# Patient Record
Sex: MALE | Race: WHITE
[De-identification: names, ages, dates, MRNs, and addresses within clinical notes are randomized per-mention and may not be internally consistent; named-entity substitution may affect disease eponyms.]

---

## 2019-02-18 LAB
BUN BLD-MCNC: 16 MG/DL (ref 7–18)
GLUCOSE SERPLBLD-MCNC: 162 MG/DL (ref 74–106)
HCT VFR BLD CALC: 42.6 % (ref 39.6–49)
INR BLD: 1.17
LYMPHOCYTES # SPEC AUTO: 1.7 K/UL (ref 0.7–4.9)
PMV BLD: 8 FL (ref 7.6–11.3)
POTASSIUM SERPL-SCNC: 4.2 MMOL/L (ref 3.5–5.1)
RBC # BLD: 4.49 M/UL (ref 4.33–5.43)

## 2019-02-18 NOTE — EKG
Test Date:    2019-02-18               Test Time:    14:13:37

Technician:   NILO                                    

                                                     

MEASUREMENT RESULTS:                                       

Intervals:                                           

Rate:         62                                     

NY:                                                  

QRSD:         104                                    

QT:           406                                    

QTc:          412                                    

Axis:                                                

P:                                                   

NY:                                                  

QRS:          -19                                    

T:            91                                     

                                                     

INTERPRETIVE STATEMENTS:                                       

                                                     

Atrial fibrillation with VVI pacing

Nonspecific ST and T wave abnormality

Abnormal ECG

Compared to ECG 09/19/2013 07:43:26

ventricular pacing is now evident

Electronically Signed On 02-18-19 17:22:17 CST by Edy Graves

## 2019-02-18 NOTE — RAD REPORT
EXAM DESCRIPTION:  Ho Marroquin And Sirena (2 Views)2/18/2019 2:41 pm

 

CLINICAL HISTORY:  Preop for carotid surgery

 

COMPARISON:  2013

 

FINDINGS:   The lungs appear clear of acute infiltrate. The heart is mildly enlarged. Pacemaker leads
 are in place.

 

IMPRESSION:   No acute abnormalities displayed

## 2019-02-19 ENCOUNTER — HOSPITAL ENCOUNTER (OUTPATIENT)
Dept: HOSPITAL 97 - CCL | Age: 79
Discharge: HOME | End: 2019-02-19
Attending: INTERNAL MEDICINE
Payer: COMMERCIAL

## 2019-02-19 VITALS — SYSTOLIC BLOOD PRESSURE: 143 MMHG | OXYGEN SATURATION: 98 % | DIASTOLIC BLOOD PRESSURE: 60 MMHG

## 2019-02-19 VITALS — TEMPERATURE: 97.1 F

## 2019-02-19 DIAGNOSIS — I65.23: Primary | ICD-10-CM

## 2019-02-19 DIAGNOSIS — I67.9: ICD-10-CM

## 2019-02-19 DIAGNOSIS — I25.10: ICD-10-CM

## 2019-02-19 DIAGNOSIS — Z95.2: ICD-10-CM

## 2019-02-19 DIAGNOSIS — E11.9: ICD-10-CM

## 2019-02-19 DIAGNOSIS — E78.5: ICD-10-CM

## 2019-02-19 DIAGNOSIS — I10: ICD-10-CM

## 2019-02-19 DIAGNOSIS — Z95.1: ICD-10-CM

## 2019-02-19 DIAGNOSIS — I35.0: ICD-10-CM

## 2019-02-19 PROCEDURE — 36415 COLL VENOUS BLD VENIPUNCTURE: CPT

## 2019-02-19 PROCEDURE — 82962 GLUCOSE BLOOD TEST: CPT

## 2019-02-19 PROCEDURE — 36222 PLACE CATH CAROTID/INOM ART: CPT

## 2019-02-19 PROCEDURE — 80048 BASIC METABOLIC PNL TOTAL CA: CPT

## 2019-02-19 PROCEDURE — 93005 ELECTROCARDIOGRAM TRACING: CPT

## 2019-02-19 PROCEDURE — 85730 THROMBOPLASTIN TIME PARTIAL: CPT

## 2019-02-19 PROCEDURE — 85610 PROTHROMBIN TIME: CPT

## 2019-02-19 PROCEDURE — 71046 X-RAY EXAM CHEST 2 VIEWS: CPT

## 2019-02-19 PROCEDURE — 85025 COMPLETE CBC W/AUTO DIFF WBC: CPT

## 2019-02-19 NOTE — OP
Surgeon:  Froylan Urban MD



Assistant:  Radhames Kirkland.



Admitted on 02/19/2019 as an outpatient for a carotid angiogram.



Procedure:  Selective bilateral carotid angiogram.



Indication:  Cerebrovascular disease with positive carotid Doppler. 



Mr. Card is 78, has history of CAD, status post CABG.  Has history of hypertension, dyslipidemia,
 diabetes, positive carotid Doppler on the right.  Has had a history of aortic valve replacement as w
ell.



Description Of Procedure:  Was admitted as an outpatient to the cath lab, prepped and draped in the r
outine sterile fashion.  A JR4 6-Taiwanese was used as a catheter to do the carotid angiography.  He had
 a 6-Taiwanese sheath introduced in the right common femoral artery.  Angio-Seal was used to close the c
ase.  Carotid angiography revealed 75% to 80% right internal carotid artery, and the external carotid
 artery has some moderate plaquing bilaterally.  He has some plaquing in the left common carotid seng
ry.



Total Conscious Sedation:  30 minutes.



Complications:  None.



Blood Loss:  5 cc.



Postoperative Diagnosis:  Cerebrovascular disease.



Plan:  For a CEA on the right side of his carotid.



:  ISABELA Masterson/HOA

DD:  02/19/2019 10:28:00Voice ID:  [id number]

DT:  02/19/2019 22:12:33Report ID:  [id number]

## 2020-02-17 LAB
BUN BLD-MCNC: 18 MG/DL (ref 7–18)
GLUCOSE SERPLBLD-MCNC: 129 MG/DL (ref 74–106)
HCT VFR BLD CALC: 37 % (ref 39.6–49)
INR BLD: 2.69
LYMPHOCYTES # SPEC AUTO: 1.4 K/UL (ref 0.7–4.9)
PMV BLD: 7.7 FL (ref 7.6–11.3)
POTASSIUM SERPL-SCNC: 4.2 MMOL/L (ref 3.5–5.1)
RBC # BLD: 4.01 M/UL (ref 4.33–5.43)

## 2020-02-17 NOTE — RAD REPORT
EXAM DESCRIPTION:  RAD - Chest Pa And Lat (2 Views) - 2/17/2020 11:41 am

 

CLINICAL HISTORY:  pre-op for cardioversion

Chest pain.

 

COMPARISON:  Chest Pa And Lat (2 Views) dated 2/18/2019; CHEST SINGLE VIEW dated 9/18/2013; CHEST PA 
AND LAT 2 VIEW dated 5/4/2011; CHEST PA AND LAT 2 VIEW dated 7/7/2010

 

FINDINGS:  The lungs are clear. The heart is moderately enlarged with a multi lead pacer device. No d
isplaced fractures. Sternotomy wires seen.

## 2020-02-17 NOTE — EKG
Test Date:    2020-02-17               Test Time:    11:39:44

Technician:   MEENAKSHI                                     

                                                     

MEASUREMENT RESULTS:                                       

Intervals:                                           

Rate:         79                                     

RI:                                                  

QRSD:         106                                    

QT:           392                                    

QTc:          449                                    

Axis:                                                

P:                                                   

RI:                                                  

QRS:          18                                     

T:            108                                    

                                                     

INTERPRETIVE STATEMENTS:                                       

                                                     

Demand pacemaker, interpretation is based on intrinsic rhythm

Atrial fibrillation with paced complexes

Abnormal ECG

Compared to ECG 02/18/2019 14:13:37

Ventricular premature complex(es) now present

ST (T wave) deviation no longer present



Electronically Signed On 02-17-20 15:26:26 CST by Edy Graves

## 2020-02-18 ENCOUNTER — HOSPITAL ENCOUNTER (OUTPATIENT)
Dept: HOSPITAL 97 - CCL | Age: 80
Discharge: HOME HEALTH | End: 2020-02-18
Attending: INTERNAL MEDICINE
Payer: COMMERCIAL

## 2020-02-18 VITALS — BODY MASS INDEX: 29.3 KG/M2

## 2020-02-18 VITALS — TEMPERATURE: 97.9 F | DIASTOLIC BLOOD PRESSURE: 56 MMHG | OXYGEN SATURATION: 96 % | SYSTOLIC BLOOD PRESSURE: 125 MMHG

## 2020-02-18 DIAGNOSIS — Z95.2: ICD-10-CM

## 2020-02-18 DIAGNOSIS — K21.9: ICD-10-CM

## 2020-02-18 DIAGNOSIS — Z79.01: ICD-10-CM

## 2020-02-18 DIAGNOSIS — I25.10: ICD-10-CM

## 2020-02-18 DIAGNOSIS — I48.91: Primary | ICD-10-CM

## 2020-02-18 DIAGNOSIS — Z95.1: ICD-10-CM

## 2020-02-18 DIAGNOSIS — I10: ICD-10-CM

## 2020-02-18 DIAGNOSIS — E78.5: ICD-10-CM

## 2020-02-18 DIAGNOSIS — E11.9: ICD-10-CM

## 2020-02-18 PROCEDURE — 85025 COMPLETE CBC W/AUTO DIFF WBC: CPT

## 2020-02-18 PROCEDURE — 82947 ASSAY GLUCOSE BLOOD QUANT: CPT

## 2020-02-18 PROCEDURE — 85730 THROMBOPLASTIN TIME PARTIAL: CPT

## 2020-02-18 PROCEDURE — 71046 X-RAY EXAM CHEST 2 VIEWS: CPT

## 2020-02-18 PROCEDURE — 80048 BASIC METABOLIC PNL TOTAL CA: CPT

## 2020-02-18 PROCEDURE — 92960 CARDIOVERSION ELECTRIC EXT: CPT

## 2020-02-18 PROCEDURE — 85610 PROTHROMBIN TIME: CPT

## 2020-02-18 PROCEDURE — 36415 COLL VENOUS BLD VENIPUNCTURE: CPT

## 2020-02-18 PROCEDURE — 93005 ELECTROCARDIOGRAM TRACING: CPT

## 2020-02-18 NOTE — EKG
Test Date:    2020-02-18               Test Time:    07:57:53

Technician:   MEENAKSHI                                     

                                                     

MEASUREMENT RESULTS:                                       

Intervals:                                           

Rate:         72                                     

NH:           254                                    

QRSD:         110                                    

QT:           404                                    

QTc:          442                                    

Axis:                                                

P:            74                                     

NH:           254                                    

QRS:          27                                     

T:            109                                    

                                                     

INTERPRETIVE STATEMENTS:                                       

                                                     

Sinus rhythm with 1st degree AV block

Low voltage QRS

ST & T wave abnormality, consider lateral ischemia

Abnormal ECG

Compared to ECG 02/18/2020 07:57:26

First degree AV block now present

Low QRS voltage now present

ST (T wave) deviation now present

Possible ischemia now present

Ventricular-paced complex(es) or rhythm no longer present



Electronically Signed On 02-18-20 15:24:48 CST by Froylan Urban

## 2020-02-18 NOTE — OP
Date of Procedure:  02/18/2020



Surgeon:  Froylan Urban MD



Assistant:  Idania Romero.   



The patient will go home when he wakes up and I will see him in the office in the next 2 to 4 weeks.



Admitted to my service as an outpatient on today 02/18/2020.



Reason For Admission:  Direct current cardioversion.



Indication:  Atrial fibrillation.



History Of Present Illness:  Mr. Card is 79-year-old white male, has multiple medical problems in
cluding history of aortic stenosis status post AVR, history of bypass surgery for coronary artery dis
ease, history of carotid surgery secondary to carotid stenosis, diabetes, hypertension, dyslipidemia,
 paroxysmal atrial fibrillation with symptoms of significant dyspnea on exertion, unresponsive to bet
a-blockers and Coumadin.  He was admitted for cardioversion.  He received 8 mg of Versed IV push for 
sedation, received 1 shock of 200 joules and converted to sinus rhythm.  There were no complications 
or blood loss.



Final Diagnosis:  Successful cardioversion of atrial fibrillation to sinus rhythm.  



We will continue Coumadin.  Stop metoprolol.  Start Multaq 400 mg b.i.d.





NB/HOA

DD:  02/18/2020 07:50:11Voice ID:  [id number]

DT:  02/18/2020 18:19:28Report ID:  [id number]

## 2020-02-18 NOTE — EKG
Test Date:    2020-02-18               Test Time:    07:57:26

Technician:   MEENAKSHI                                     

                                                     

MEASUREMENT RESULTS:                                       

Intervals:                                           

Rate:         70                                     

VT:           156                                    

QRSD:         180                                    

QT:           474                                    

QTc:          511                                    

Axis:                                                

P:            73                                     

VT:           156                                    

QRS:          153                                    

T:            83                                     

                                                     

INTERPRETIVE STATEMENTS:                                       

                                                     

Electronic ventricular pacemaker

Compared to ECG 02/17/2020 11:39:44

Atrial fibrillation no longer present



Electronically Signed On 02-18-20 15:24:49 CST by Froylan Urban

## 2020-02-20 ENCOUNTER — HOSPITAL ENCOUNTER (EMERGENCY)
Dept: HOSPITAL 97 - ER | Age: 80
Discharge: HOME | End: 2020-02-20
Payer: COMMERCIAL

## 2020-02-20 VITALS — DIASTOLIC BLOOD PRESSURE: 47 MMHG | TEMPERATURE: 98.2 F | SYSTOLIC BLOOD PRESSURE: 152 MMHG | OXYGEN SATURATION: 95 %

## 2020-02-20 DIAGNOSIS — Z95.4: ICD-10-CM

## 2020-02-20 DIAGNOSIS — M10.9: Primary | ICD-10-CM

## 2020-02-20 DIAGNOSIS — I10: ICD-10-CM

## 2020-02-20 PROCEDURE — 99283 EMERGENCY DEPT VISIT LOW MDM: CPT

## 2020-02-20 NOTE — EDPHYS
Physician Documentation                                                                           

 [facility]                                                                 

Name: Mamadou Card                                                                              

Age: 79 yrs                                                                                       

Sex: Male                                                                                         

: 1940                                                                                   

MRN: [id number]                                                                                   

Arrival Date: 2020                                                                          

Time: 04:04                                                                                       

Account#: [id number]                                                                            

Bed 18                                                                                            

Private MD:                                                                                       

MILAN Physician Akira Jaramillo                                                                     

Historical:                                                                                       

- Allergies:                                                                                      

                                                                                             

05:01 No Known Allergies;                                                                       

- PMHx:                                                                                           

05:01 Bypass; CHF; Diabetes - NIDDM; Hypertension; High Cholesterol;                            

- PSHx:                                                                                           

05:01 Aortic Valve Replacement; Cardioversion;                                                  

                                                                                                  

- Immunization history:: Adult Immunizations up to date.                                          

- Coronavirus screen:: The patient has NOT traveled to [place] in the past 14 days.                 

- Social history:: Smoking status: Patient/guardian denies using.                                 

- Ebola Screening: : Patient negative for fever greater than or equal to 101.5 degrees            

  Fahrenheit, and additional compatible Ebola Virus Disease symptoms Patient denies               

  exposure to infectious person.                                                                  

                                                                                                  

                                                                                                  

Vital Signs:                                                                                      

04:15  / 47; Pulse 68; Resp 18; Temp 98.2; Pulse Ox 95% ; Weight 90.72 kg; Height 5 ft. wh  

      9 in. (175.26 cm); Pain 5/10;                                                               

05:04  / 55; Pulse 70; Resp 18; Pulse Ox 95% ;                                            

04:15 Body Mass Index 29.53 (90.72 kg, 175.26 cm)                                               

                                                                                                  

MDM:                                                                                              

04:07 Patient medically screened.                                                             tw4 

                                                                                                  

Administered Medications:                                                                         

04:47 Drug: Colchicine-Probenecid 1 tabs {Note: 2 tabs of Colchicine 0.6 as per MD              

      instructions .} Route: PO;                                                                  

05:12 Follow up: Response: No adverse reaction                                                  

04:48 Not Given (Physician Discretion): Norco (7.5 mg-325 mg) 1 tabs PO once; RASS on ADMIN:    

      Combtv4, Very Agttd3, Agttd2, Rstlss1, AlertClm0, Drwsy-1, Lt Sdtn-2, Mod Sdtn-3, Dp        

      Sdtn-4, UnArsble-5                                                                          

04:53 Drug: Norco 5 mg-325 mg 1 tabs Route: PO;                                                 

05:12 Follow up: Response: No adverse reaction; Pain is decreased; RASS: Alert and Calm (0)     

                                                                                                  

                                                                                                  

Disposition:                                                                                      

20 04:56 Discharged to Home. Impression: Gout.                                              

- Condition is Stable.                                                                            

- Discharge Instructions: Gout.                                                                   

- Prescriptions for colchicine 0.6 mg Oral tablet - take 1 tablet by ORAL route once              

  daily take one tab 0.6 mg one hour after acute flare then wait 12 hours take 0.6mg              

  for 7 days; 8 tablet. Tylenol- Codeine #3 300-30 mg Oral Tablet - take 2 tablet by              

  ORAL route every 6 hours As needed; 6 tablet.                                                   

- Medication Reconciliation Form, Thank You Letter, Antibiotic Education, Prescription            

  Opioid Use form.                                                                                

- Follow up: Private Physician; When: Upon discharge from the Emergency Department;               

  Reason: Recheck today's complaints, Continuance of care, Re-evaluation by your                  

  physician.                                                                                      

- Problem is new.                                                                                 

- Symptoms have improved.                                                                         

                                                                                                  

                                                                                                  

                                                                                                  

Addendum:                                                                                         

2020                                                                                        

     07:23 Addendum: Pt is a 79 year old patient with a history of gout comes to the ED with       t
w4

           complaint of right knee pain since yesterday. Pt denies fever or chills. Pt denies     

           states that he has some difficulty walking. Pt denies other symptoms at this time.     

           Addendum: ROS: Constitutional; negative for fever chills HEENT: negative for sore      

           throat, dysphagia Resp: negative for SOB, DURON CV: negative for CP, DURON, palpations Ext:

           positive for swelling of the right knee, mild joint effusion, pulses intact.           

     07:41 Addendum: PE: General: well developed well nourished male in NAD HEENT:PERRLA, EOMI     t
w4

           Resp:CTAB no resp distress CV: RRR, S1,S2 no murmurs no gallops Ext: right knee mild   

           joint effusion no erythema, decreased ROM, pulses intact.                              

                                                                                                  

Signatures:                                                                                       

Anabela Chu                                                                                   

Akira Jaramillo MD MD   tw4                                                  

                                                                                                  

Corrections: (The following items were deleted from the chart)                                    

                                                                                             

05:12 04:56 2020 04:56 Discharged to Home. Impression: Gout. Condition is Stable. Forms wh  

      are Medication Reconciliation Form, Thank You Letter, Antibiotic Education,                 

      Prescription Opioid Use. Follow up: Private Physician; When: Upon discharge from the        

      Emergency Department; Reason: Recheck today's complaints, Continuance of care,              

      Re-evaluation by your physician. Problem is new. Symptoms have improved. tw4                

                                                                                                  

**************************************************************************************************

## 2020-02-20 NOTE — ER
Nurse's Notes                                                                                     

 [facility]                                                                 

Name: Mamadou Card                                                                              

Age: 79 yrs                                                                                       

Sex: Male                                                                                         

: 1940                                                                                   

MRN: [id number]                                                                                   

Arrival Date: 2020                                                                          

Time: 04:04                                                                                       

Account#: [id number]                                                                            

Bed 18                                                                                            

Private MD:                                                                                       

Diagnosis: Gout                                                                                   

                                                                                                  

Presentation:                                                                                     

                                                                                             

04:10 Presenting complaint: Patient states: Right knee pain that started yesterday. Pt states wh  

      Hx of gout and that he has the same problem a year ago with the same leg that was           

      drained by Dr Carlos. Transition of care: patient was not received from another          

      setting of care. Onset of symptoms was 2020. Risk Assessment: Do you want      

      to hurt yourself or someone else? Patient reports no desire to harm self or others.         

      Initial Sepsis Screen: Does the patient meet any 2 criteria? No. Patient's initial          

      sepsis screen is negative. Does the patient have a suspected source of infection? No.       

      Patient's initial sepsis screen is negative. Care prior to arrival: None.                   

04:10 Method Of Arrival: Wheelchair                                                             

04:10 Acuity: Unassigned                                                                        

04:10 Acuity: JOSE JUAN 4                                                                             

                                                                                                  

Historical:                                                                                       

- Allergies:                                                                                      

05:01 No Known Allergies;                                                                       

- PMHx:                                                                                           

05:01 Bypass; CHF; Diabetes - NIDDM; Hypertension; High Cholesterol;                            

- PSHx:                                                                                           

05:01 Aortic Valve Replacement; Cardioversion;                                                wh  

                                                                                                  

- Immunization history:: Adult Immunizations up to date.                                          

- Coronavirus screen:: The patient has NOT traveled to [place] in the past 14 days.                 

- Social history:: Smoking status: Patient/guardian denies using.                                 

- Ebola Screening: : Patient negative for fever greater than or equal to 101.5 degrees            

  Fahrenheit, and additional compatible Ebola Virus Disease symptoms Patient denies               

  exposure to infectious person.                                                                  

                                                                                                  

                                                                                                  

Screenin:10 Abuse screen: Denies threats or abuse. Denies injuries from another. Nutritional          

      screening: No deficits noted. Tuberculosis screening: No symptoms or risk factors           

      identified. Fall Risk None identified.                                                      

                                                                                                  

Assessment:                                                                                       

04:15 General: Appears in no apparent distress. Behavior is calm, cooperative, appropriate    wh  

      for age. Pain: Complains of pain in Right Knee Pain does not radiate. Pain currently is     

      5 out of 10 on a pain scale. Quality of pain is described as aching, Pain began 1 day       

      ago. Neuro: Level of Consciousness is awake, alert, obeys commands, Oriented to person,     

      place, time, situation, Appropriate for age. Cardiovascular: Capillary refill < 3           

      seconds. Respiratory: Airway is patent Respiratory effort is even, unlabored,               

      Respiratory pattern is regular, symmetrical. GI: Abdomen is flat, non-distended. : No     

      signs and/or symptoms were reported regarding the genitourinary system. EENT: No signs      

      and/or symptoms were reported regarding the EENT system. Derm: Skin is intact, is           

      healthy with good turgor, Skin is pink, warm \T\ dry. normal. Musculoskeletal:              

      Circulation, motion, and sensation intact.                                                  

                                                                                                  

Vital Signs:                                                                                      

04:15  / 47; Pulse 68; Resp 18; Temp 98.2; Pulse Ox 95% ; Weight 90.72 kg; Height 5 ft. wh  

      9 in. (175.26 cm); Pain 5/10;                                                               

05:04  / 55; Pulse 70; Resp 18; Pulse Ox 95% ;                                          wh  

04:15 Body Mass Index 29.53 (90.72 kg, 175.26 cm)                                               

                                                                                                  

ED Course:                                                                                        

04:04 Patient arrived in ED.                                                                  cl3 

04:07 Akira Jaramillo MD is Attending Physician.                                            tw4 

04:10 Patient has correct armband on for positive identification. Bed in low position. Call     

      light in reach. Side rails up X 1. Pulse ox on. NIBP on.                                    

04:10 Arm band placed on right wrist.                                                           

04:38 Anabela Chu is Primary Nurse.                                                           

04:59 Triage completed.                                                                         

05:03 No provider procedures requiring assistance completed. Patient did not have IV access     

      during this emergency room visit.                                                           

                                                                                                  

Administered Medications:                                                                         

04:47 Drug: Colchicine-Probenecid 1 tabs {Note: 2 tabs of Colchicine 0.6 as per MD              

      instructions .} Route: PO;                                                                  

05:12 Follow up: Response: No adverse reaction                                                  

04:48 Not Given (Physician Discretion): Norco (7.5 mg-325 mg) 1 tabs PO once; RASS on ADMIN:    

      Combtv4, Very Agttd3, Agttd2, Rstlss1, AlertClm0, Drwsy-1, Lt Sdtn-2, Mod Sdtn-3, Dp        

      Sdtn-4, UnArsble-5                                                                          

04:53 Drug: Norco 5 mg-325 mg 1 tabs Route: PO;                                                 

05:12 Follow up: Response: No adverse reaction; Pain is decreased; RASS: Alert and Calm (0)     

                                                                                                  

                                                                                                  

Outcome:                                                                                          

04:56 Discharge ordered by MD.                                                                [email or web address] 

05:04 Discharged to home via wheelchair, with family.                                           

05:04 Condition: stable                                                                           

05:04 Discharge instructions given to patient, family, Instructed on discharge instructions,      

      follow up and referral plans. no drinking with medication, no driving heavy equipment,      

      medication usage, POC Demonstrated understanding of instructions, follow-up care,           

      medications, POC Prescriptions given X 2.                                                   

05:12 Patient left the ED.                                                                      

                                                                                                  

Signatures:                                                                                       

Anabela Chu                                                                                   

Akira Jaramillo MD MD   tw4                                                  

Melita Aponte                                cl3                                                  

                                                                                                  

**************************************************************************************************

## 2022-07-06 LAB
BUN BLD-MCNC: 58 MG/DL (ref 7–18)
GLUCOSE SERPLBLD-MCNC: 176 MG/DL (ref 74–106)
HCT VFR BLD CALC: 24.7 % (ref 39.6–49)
INR BLD: 1.74
LYMPHOCYTES # SPEC AUTO: 1.1 K/UL (ref 0.7–4.9)
MCV RBC: 86.9 FL (ref 80–100)
PMV BLD: 8.3 FL (ref 7.6–11.3)
POTASSIUM SERPL-SCNC: 4.9 MMOL/L (ref 3.5–5.1)
RBC # BLD: 2.85 M/UL (ref 4.33–5.43)

## 2022-07-06 NOTE — RAD REPORT
EXAM DESCRIPTION:  Ho Marroquin And Sirena (2 Views)7/6/2022 10:31 am

 

CLINICAL HISTORY:  Preop for cardiac catheterization.

 

COMPARISON:  2020

 

FINDINGS:   Mild bilateral interstitial lung opacities.

 

Cardiomegaly

 

Small pleural effusions are suspected. Pacemaker leads in place.

 

Postsurgical changes involve chest

 

IMPRESSION:  Mild CHF

## 2022-07-06 NOTE — EKG
Test Date:    2022-07-06               Test Time:    09:55:27

Technician:   HORTENCIA                                     

                                                     

MEASUREMENT RESULTS:                                       

Intervals:                                           

Rate:         73                                     

AL:                                                  

QRSD:         110                                    

QT:           390                                    

QTc:          429                                    

Axis:                                                

P:                                                   

AL:                                                  

QRS:          -28                                    

T:            135                                    

                                                     

INTERPRETIVE STATEMENTS:                                       

                                                     

Demand pacemaker, interpretation is based on intrinsic rhythm

Atrial fibrillation with premature ventricular or aberrantly conducted

complexes

ST & T wave abnormality, consider lateral ischemia or digitalis effect

Abnormal ECG

Compared to ECG 02/18/2020 07:57:53

Ventricular premature complex(es) now present

Sinus rhythm no longer present

First degree AV block no longer present

ST (T wave) deviation still present

Possible ischemia still present



Electronically Signed On 07-06-22 11:05:26 CDT by Fransisco Perry

## 2022-07-07 ENCOUNTER — HOSPITAL ENCOUNTER (OUTPATIENT)
Dept: HOSPITAL 97 - CCL | Age: 82
Discharge: ANOTHER HOSPITAL | End: 2022-07-07
Attending: INTERNAL MEDICINE
Payer: COMMERCIAL

## 2022-07-07 VITALS — SYSTOLIC BLOOD PRESSURE: 134 MMHG | DIASTOLIC BLOOD PRESSURE: 54 MMHG

## 2022-07-07 VITALS — TEMPERATURE: 97.3 F

## 2022-07-07 VITALS — OXYGEN SATURATION: 97 %

## 2022-07-07 DIAGNOSIS — R31.9: ICD-10-CM

## 2022-07-07 DIAGNOSIS — Z79.01: ICD-10-CM

## 2022-07-07 DIAGNOSIS — Z79.02: ICD-10-CM

## 2022-07-07 DIAGNOSIS — Z79.82: ICD-10-CM

## 2022-07-07 DIAGNOSIS — K92.1: ICD-10-CM

## 2022-07-07 DIAGNOSIS — Z95.1: ICD-10-CM

## 2022-07-07 DIAGNOSIS — I48.91: ICD-10-CM

## 2022-07-07 DIAGNOSIS — I50.32: ICD-10-CM

## 2022-07-07 DIAGNOSIS — I25.82: ICD-10-CM

## 2022-07-07 DIAGNOSIS — D64.9: ICD-10-CM

## 2022-07-07 DIAGNOSIS — I11.0: ICD-10-CM

## 2022-07-07 DIAGNOSIS — Z95.2: ICD-10-CM

## 2022-07-07 DIAGNOSIS — K21.9: ICD-10-CM

## 2022-07-07 DIAGNOSIS — Z20.822: ICD-10-CM

## 2022-07-07 DIAGNOSIS — E78.5: ICD-10-CM

## 2022-07-07 DIAGNOSIS — Z79.899: ICD-10-CM

## 2022-07-07 DIAGNOSIS — I25.110: Primary | ICD-10-CM

## 2022-07-07 LAB — HCT VFR BLD CALC: 24.8 % (ref 39.6–49)

## 2022-07-07 PROCEDURE — 86901 BLOOD TYPING SEROLOGIC RH(D): CPT

## 2022-07-07 PROCEDURE — 36415 COLL VENOUS BLD VENIPUNCTURE: CPT

## 2022-07-07 PROCEDURE — 86850 RBC ANTIBODY SCREEN: CPT

## 2022-07-07 PROCEDURE — 93005 ELECTROCARDIOGRAM TRACING: CPT

## 2022-07-07 PROCEDURE — 85018 HEMOGLOBIN: CPT

## 2022-07-07 PROCEDURE — 85014 HEMATOCRIT: CPT

## 2022-07-07 PROCEDURE — 80048 BASIC METABOLIC PNL TOTAL CA: CPT

## 2022-07-07 PROCEDURE — 71046 X-RAY EXAM CHEST 2 VIEWS: CPT

## 2022-07-07 PROCEDURE — 85025 COMPLETE CBC W/AUTO DIFF WBC: CPT

## 2022-07-07 PROCEDURE — 93455 CORONARY ART/GRFT ANGIO S&I: CPT

## 2022-07-07 PROCEDURE — 85730 THROMBOPLASTIN TIME PARTIAL: CPT

## 2022-07-07 PROCEDURE — 85610 PROTHROMBIN TIME: CPT

## 2022-07-07 PROCEDURE — 86900 BLOOD TYPING SEROLOGIC ABO: CPT

## 2022-07-07 PROCEDURE — 82947 ASSAY GLUCOSE BLOOD QUANT: CPT

## 2022-07-07 PROCEDURE — 87811 SARS-COV-2 COVID19 W/OPTIC: CPT

## 2022-07-11 NOTE — SS
Date of Discharge:  07/07/2022



Reason For Admission:  Severe anemia and GI bleed as well as unstable angina.



History Of Present Illness:  Mr. Card is an 81-year-old.  Has significant past medical history of
 coronary artery disease.  He is status post bypass surgery with LIMA and ROSALIND to the LAD and the RCA
 respectively.  He has had an aortic valve replacement surgery.  Has had chronic diastolic congestive
 heart failure.  He has dyslipidemia, hypertension, gastroesophageal reflux disease.  Came in with un
stable angina symptoms, was found to be severely anemic.  Has had some melena.  He was brought to the
 cath lab as an outpatient initially to undergo a heart catheterization, which revealed a completely 
occluded RCA and LAD, normal circumflex.  His LIMA to the LAD was patent.  His ROSALIND to the RCA was pa
tent.  He was admitted for GI workup and consultation; however, none of the gastroenterologists were 
available when he was admitted.  We decided to give him a blood transfusion because his hemoglobin wa
s 7.8 and he was very symptomatic after which the patient was to be transferred for [facility] for GI workup
 secondary to melena and anemia.  He is cleared to undergo procedures from a cardiac standpoint.



Past Medical History:  As stated above.



Allergies:  NONE.



Review of Systems:

Negative.



Social History:  Negative.



Family History:  Negative.



Medications:  At home include aspirin, Plavix, Lasix, metoprolol, Prilosec, and Zocor.



Physical Examination:

Vital Signs:  Stable.  He was in a sinus rhythm. 

HEENT:  Negative. 

Neck:  Supple with no bruit. 

Chest:  Clear. 

Cardiac:  Revealed a regular rhythm and rate.  No murmurs, gallops, or rubs. 

Abdomen:  Benign. 

Extremities:  Revealed no clubbing, cyanosis, or edema.



Diagnostic Data:  He had a creatinine of 2.24.  His hemoglobin was 7.8.  Platelet counts were normal.
  Chest x-ray was negative.  EKG was unremarkable.



Impression And Plan:  The patient with severe anemia, new onset.  His last hemoglobin about few month
s ago was almost 13, now is 7.8.  He has had some melena.  He has had some hematuria.  He came in wit
h unstable angina.  Heart catheterization showed patent LIMA to the LAD, patent ROSALIND to the RCA.  He 
is cleared to transfer to [facility] in [place] for a GI workup because of his melena and anemia and he
maturia symptoms.  We will continue his present regimen for now before we hold his Plavix.  At this p
oint, continue his other medications including metoprolol and Zocor and Prilosec as well as the Lasix
.  I will prefer the Lasix to be once a day at this point and I will see him in the office after all 
that is done.





RAMAN

DD:  07/11/2022 11:17:25Voice ID:  [id number]

DT:  07/11/2022 12:46:06Report ID:  [id number]